# Patient Record
Sex: FEMALE | Race: NATIVE HAWAIIAN OR OTHER PACIFIC ISLANDER | Employment: UNEMPLOYED | URBAN - METROPOLITAN AREA
[De-identification: names, ages, dates, MRNs, and addresses within clinical notes are randomized per-mention and may not be internally consistent; named-entity substitution may affect disease eponyms.]

---

## 2022-02-25 ENCOUNTER — VIRTUAL VISIT (OUTPATIENT)
Dept: FAMILY MEDICINE CLINIC | Age: 22
End: 2022-02-25
Payer: OTHER GOVERNMENT

## 2022-02-25 DIAGNOSIS — Z3A.20 20 WEEKS GESTATION OF PREGNANCY: Primary | ICD-10-CM

## 2022-02-25 PROBLEM — T78.40XA ALLERGIES: Status: ACTIVE | Noted: 2022-02-25

## 2022-02-25 PROCEDURE — 99202 OFFICE O/P NEW SF 15 MIN: CPT | Performed by: FAMILY MEDICINE

## 2022-02-25 RX ORDER — SWAB
1 SWAB, NON-MEDICATED MISCELLANEOUS DAILY
COMMUNITY

## 2022-02-25 RX ORDER — CETIRIZINE HYDROCHLORIDE 10 MG/1
CAPSULE, LIQUID FILLED ORAL
COMMUNITY

## 2022-02-25 NOTE — PROGRESS NOTES
Chief Complaint   Patient presents with   Leigh Gaston Establish Care     No concerns     Referral Request     OB/GYN- 20 weeks pregnant        Pt new to the area, from [place], does not know of a local pharmacy, but states she will call office back to advise.

## 2022-02-25 NOTE — PROGRESS NOTES
Zechariah Moreno is a 24 y.o. female who was seen by synchronous (real-time) audio-video technology on 2/25/2022. Assessment & Plan:   Diagnoses and all orders for this visit:    1. 20 weeks gestation of pregnancy  -     REFERRAL TO OBSTETRICS AND GYNECOLOGY        Follow-up and Dispositions    · Return if symptoms worsen or fail to improve. Reviewed plan of care. Patient has provided input and agrees with goals. CPT Codes 37318-10912 for Established Patients may apply to this Telehealth Visit      Subjective:   Zechariah Moreno was seen for Establish Care (No concerns ) and Referral Request (OB/GYN- 20 weeks pregnant )      Patient presents with:  Establish Care: No concerns   Referral Request: OB/GYN- 20 weeks pregnant     She needs an OB referral.        Review of Systems   Constitutional: Negative for malaise/fatigue. Genitourinary:        No vaginal bleeding or fluid. She just felt fetal movement several days ago. Objective:     Physical Exam  Constitutional:       General: She is not in acute distress. Appearance: Normal appearance. Neurological:      Mental Status: She is alert and oriented to person, place, and time. Due to this being a TeleHealth evaluation, many elements of the physical examination are unable to be assessed. We discussed the expected course, resolution and complications of the diagnosis(es) in detail. Medication risks, benefits, costs, interactions, and alternatives were discussed as indicated. I advised her to contact the office if her condition worsens, changes or fails to improve as anticipated. She expressed understanding with the diagnosis(es) and plan.          Pursuant to the emergency declaration under the [facility], [facility] waiver authority and the [employer] and [employer] General Act, this Virtual  Visit was conducted, with patient's consent, to reduce the patient's risk of exposure to COVID-19 and provide continuity of care for an established patient. Services were provided through a video synchronous discussion virtually to substitute for in-person clinic visit.     Marielle Posey MD

## 2022-03-04 ENCOUNTER — TELEPHONE (OUTPATIENT)
Dept: FAMILY MEDICINE CLINIC | Age: 22
End: 2022-03-04

## 2022-03-07 ENCOUNTER — TELEPHONE (OUTPATIENT)
Dept: FAMILY MEDICINE CLINIC | Age: 22
End: 2022-03-07

## 2022-03-07 DIAGNOSIS — Z3A.20 20 WEEKS GESTATION OF PREGNANCY: Primary | ICD-10-CM

## 2022-03-14 NOTE — TELEPHONE ENCOUNTER
Pt called to check status of  referral to see Dr Deborah Shaw at [facility] on 3/17/22 for OB and 20 week ultrasound.

## 2022-03-15 NOTE — TELEPHONE ENCOUNTER
Luis referral to Dr. Alexandra Griffin cancelled and changed to Dr. Jerry Mccray per pt request.  Referral remains approved under same authorization number ([id number]). Referral faxed to office 3/15/22.  Walter

## 2022-03-19 PROBLEM — T78.40XA ALLERGIES: Status: ACTIVE | Noted: 2022-02-25

## 2022-03-30 LAB
ANTIBODY SCREEN, EXTERNAL: NEGATIVE
CHLAMYDIA, EXTERNAL: NEGATIVE
HBSAG, EXTERNAL: NEGATIVE
HIV, EXTERNAL: NEGATIVE
N. GONORRHEA, EXTERNAL: NEGATIVE
RPR, EXTERNAL: NONREACTIVE
RUBELLA, EXTERNAL: NORMAL
TYPE, ABO & RH, EXTERNAL: NORMAL

## 2022-06-30 LAB — GRBS, EXTERNAL: NEGATIVE

## 2022-07-25 ENCOUNTER — ANESTHESIA (OUTPATIENT)
Dept: LABOR AND DELIVERY | Age: 22
DRG: 560 | End: 2022-07-25
Payer: MEDICAID

## 2022-07-25 ENCOUNTER — HOSPITAL ENCOUNTER (INPATIENT)
Age: 22
LOS: 2 days | Discharge: HOME | DRG: 560 | End: 2022-07-27
Attending: OBSTETRICS & GYNECOLOGY | Admitting: OBSTETRICS & GYNECOLOGY
Payer: MEDICAID

## 2022-07-25 ENCOUNTER — ANESTHESIA EVENT (OUTPATIENT)
Dept: LABOR AND DELIVERY | Age: 22
DRG: 560 | End: 2022-07-25
Payer: MEDICAID

## 2022-07-25 PROBLEM — Z34.90 PREGNANCY: Status: ACTIVE | Noted: 2022-07-25

## 2022-07-25 LAB
DAILY QC (YES/NO)?: YES
ERYTHROCYTE [DISTWIDTH] IN BLOOD BY AUTOMATED COUNT: 13 % (ref 11.5–14.5)
HCT VFR BLD AUTO: 36.8 % (ref 35–47)
HGB BLD-MCNC: 12.8 G/DL (ref 11.5–16)
MCH RBC QN AUTO: 29.7 PG (ref 26–34)
MCHC RBC AUTO-ENTMCNC: 34.8 G/DL (ref 30–36.5)
MCV RBC AUTO: 85.4 FL (ref 80–99)
NRBC # BLD: 0 K/UL (ref 0–0.01)
NRBC BLD-RTO: 0 PER 100 WBC
PH, VAGINAL FLUID: NORMAL (ref 5–6.1)
PLATELET # BLD AUTO: 280 K/UL (ref 150–400)
PMV BLD AUTO: 10.1 FL (ref 8.9–12.9)
RBC # BLD AUTO: 4.31 M/UL (ref 3.8–5.2)
WBC # BLD AUTO: 9.8 K/UL (ref 3.6–11)

## 2022-07-25 PROCEDURE — 2709999900 HC NON-CHARGEABLE SUPPLY

## 2022-07-25 PROCEDURE — 83986 ASSAY PH BODY FLUID NOS: CPT | Performed by: OBSTETRICS & GYNECOLOGY

## 2022-07-25 PROCEDURE — 76060000078 HC EPIDURAL ANESTHESIA: Performed by: STUDENT IN AN ORGANIZED HEALTH CARE EDUCATION/TRAINING PROGRAM

## 2022-07-25 PROCEDURE — 74011250637 HC RX REV CODE- 250/637

## 2022-07-25 PROCEDURE — 74011000250 HC RX REV CODE- 250: Performed by: STUDENT IN AN ORGANIZED HEALTH CARE EDUCATION/TRAINING PROGRAM

## 2022-07-25 PROCEDURE — 75410000000 HC DELIVERY VAGINAL/SINGLE: Performed by: OBSTETRICS & GYNECOLOGY

## 2022-07-25 PROCEDURE — 85027 COMPLETE CBC AUTOMATED: CPT

## 2022-07-25 PROCEDURE — 65270000029 HC RM PRIVATE

## 2022-07-25 PROCEDURE — 74011250637 HC RX REV CODE- 250/637: Performed by: OBSTETRICS & GYNECOLOGY

## 2022-07-25 PROCEDURE — 36415 COLL VENOUS BLD VENIPUNCTURE: CPT

## 2022-07-25 PROCEDURE — 74011250636 HC RX REV CODE- 250/636: Performed by: OBSTETRICS & GYNECOLOGY

## 2022-07-25 PROCEDURE — 77030005513 HC CATH URETH FOL11 MDII -B

## 2022-07-25 PROCEDURE — 75410000002 HC LABOR FEE PER 1 HR: Performed by: OBSTETRICS & GYNECOLOGY

## 2022-07-25 PROCEDURE — 75410000003 HC RECOV DEL/VAG/CSECN EA 0.5 HR: Performed by: OBSTETRICS & GYNECOLOGY

## 2022-07-25 RX ORDER — SODIUM CHLORIDE, SODIUM LACTATE, POTASSIUM CHLORIDE, CALCIUM CHLORIDE 600; 310; 30; 20 MG/100ML; MG/100ML; MG/100ML; MG/100ML
125 INJECTION, SOLUTION INTRAVENOUS CONTINUOUS
Status: DISCONTINUED | OUTPATIENT
Start: 2022-07-25 | End: 2022-07-25

## 2022-07-25 RX ORDER — EPHEDRINE SULFATE/0.9% NACL/PF 50 MG/5 ML
10 SYRINGE (ML) INTRAVENOUS
Status: DISCONTINUED | OUTPATIENT
Start: 2022-07-25 | End: 2022-07-25

## 2022-07-25 RX ORDER — LIDOCAINE HYDROCHLORIDE AND EPINEPHRINE 15; 5 MG/ML; UG/ML
INJECTION, SOLUTION EPIDURAL
Status: SHIPPED | OUTPATIENT
Start: 2022-07-25 | End: 2022-07-25

## 2022-07-25 RX ORDER — SIMETHICONE 80 MG
80 TABLET,CHEWABLE ORAL
Status: DISCONTINUED | OUTPATIENT
Start: 2022-07-25 | End: 2022-07-27 | Stop reason: HOSPADM

## 2022-07-25 RX ORDER — ZOLPIDEM TARTRATE 5 MG/1
5 TABLET ORAL
Status: DISCONTINUED | OUTPATIENT
Start: 2022-07-25 | End: 2022-07-27 | Stop reason: HOSPADM

## 2022-07-25 RX ORDER — ACETAMINOPHEN 325 MG/1
650 TABLET ORAL
Status: DISCONTINUED | OUTPATIENT
Start: 2022-07-25 | End: 2022-07-27 | Stop reason: HOSPADM

## 2022-07-25 RX ORDER — IBUPROFEN 800 MG/1
800 TABLET ORAL EVERY 8 HOURS
Status: DISCONTINUED | OUTPATIENT
Start: 2022-07-25 | End: 2022-07-27 | Stop reason: HOSPADM

## 2022-07-25 RX ORDER — DOCUSATE SODIUM 100 MG/1
100 CAPSULE, LIQUID FILLED ORAL
Status: DISCONTINUED | OUTPATIENT
Start: 2022-07-25 | End: 2022-07-27 | Stop reason: HOSPADM

## 2022-07-25 RX ORDER — FENTANYL/BUPIVACAINE/NS/PF 2-1250MCG
1-16 PREFILLED PUMP RESERVOIR EPIDURAL CONTINUOUS
Status: DISCONTINUED | OUTPATIENT
Start: 2022-07-25 | End: 2022-07-25

## 2022-07-25 RX ORDER — ONDANSETRON 2 MG/ML
4 INJECTION INTRAMUSCULAR; INTRAVENOUS
Status: DISCONTINUED | OUTPATIENT
Start: 2022-07-25 | End: 2022-07-25

## 2022-07-25 RX ORDER — SODIUM CHLORIDE 0.9 % (FLUSH) 0.9 %
5-40 SYRINGE (ML) INJECTION EVERY 8 HOURS
Status: DISCONTINUED | OUTPATIENT
Start: 2022-07-25 | End: 2022-07-25

## 2022-07-25 RX ORDER — OXYTOCIN/RINGER'S LACTATE 30/500 ML
87.3 PLASTIC BAG, INJECTION (ML) INTRAVENOUS AS NEEDED
Status: DISCONTINUED | OUTPATIENT
Start: 2022-07-25 | End: 2022-07-25

## 2022-07-25 RX ORDER — SODIUM CHLORIDE 0.9 % (FLUSH) 0.9 %
5-40 SYRINGE (ML) INJECTION AS NEEDED
Status: DISCONTINUED | OUTPATIENT
Start: 2022-07-25 | End: 2022-07-25

## 2022-07-25 RX ORDER — NALOXONE HYDROCHLORIDE 0.4 MG/ML
0.4 INJECTION, SOLUTION INTRAMUSCULAR; INTRAVENOUS; SUBCUTANEOUS AS NEEDED
Status: DISCONTINUED | OUTPATIENT
Start: 2022-07-25 | End: 2022-07-25

## 2022-07-25 RX ORDER — OXYTOCIN/RINGER'S LACTATE 30/500 ML
10 PLASTIC BAG, INJECTION (ML) INTRAVENOUS AS NEEDED
Status: DISCONTINUED | OUTPATIENT
Start: 2022-07-25 | End: 2022-07-27 | Stop reason: HOSPADM

## 2022-07-25 RX ORDER — OXYTOCIN/RINGER'S LACTATE 30/500 ML
10 PLASTIC BAG, INJECTION (ML) INTRAVENOUS AS NEEDED
Status: DISCONTINUED | OUTPATIENT
Start: 2022-07-25 | End: 2022-07-25

## 2022-07-25 RX ORDER — NALOXONE HYDROCHLORIDE 0.4 MG/ML
0.4 INJECTION, SOLUTION INTRAMUSCULAR; INTRAVENOUS; SUBCUTANEOUS AS NEEDED
Status: DISCONTINUED | OUTPATIENT
Start: 2022-07-25 | End: 2022-07-27 | Stop reason: HOSPADM

## 2022-07-25 RX ORDER — METHYLERGONOVINE MALEATE 0.2 MG/ML
0.2 INJECTION INTRAVENOUS
Status: ACTIVE | OUTPATIENT
Start: 2022-07-25 | End: 2022-07-26

## 2022-07-25 RX ORDER — MINERAL OIL
OIL (ML) ORAL
Status: COMPLETED
Start: 2022-07-25 | End: 2022-07-25

## 2022-07-25 RX ORDER — DIPHENHYDRAMINE HCL 25 MG
25 CAPSULE ORAL
Status: DISCONTINUED | OUTPATIENT
Start: 2022-07-25 | End: 2022-07-27 | Stop reason: HOSPADM

## 2022-07-25 RX ORDER — HYDROCORTISONE ACETATE PRAMOXINE HCL 2.5; 1 G/100G; G/100G
CREAM TOPICAL AS NEEDED
Status: DISCONTINUED | OUTPATIENT
Start: 2022-07-25 | End: 2022-07-25 | Stop reason: RX

## 2022-07-25 RX ORDER — MINERAL OIL
30 OIL (ML) ORAL DAILY
Status: DISCONTINUED | OUTPATIENT
Start: 2022-07-26 | End: 2022-07-25

## 2022-07-25 RX ORDER — NALOXONE HYDROCHLORIDE 0.4 MG/ML
0.4 INJECTION, SOLUTION INTRAMUSCULAR; INTRAVENOUS; SUBCUTANEOUS ONCE
Status: DISCONTINUED | OUTPATIENT
Start: 2022-07-25 | End: 2022-07-25

## 2022-07-25 RX ORDER — MINERAL OIL
OIL (ML) MISCELLANEOUS ONCE
Status: DISCONTINUED | OUTPATIENT
Start: 2022-07-25 | End: 2022-07-25

## 2022-07-25 RX ORDER — HYDROCODONE BITARTRATE AND ACETAMINOPHEN 5; 325 MG/1; MG/1
2 TABLET ORAL
Status: DISCONTINUED | OUTPATIENT
Start: 2022-07-25 | End: 2022-07-27 | Stop reason: HOSPADM

## 2022-07-25 RX ORDER — HYDROCODONE BITARTRATE AND ACETAMINOPHEN 5; 325 MG/1; MG/1
1 TABLET ORAL
Status: DISCONTINUED | OUTPATIENT
Start: 2022-07-25 | End: 2022-07-27 | Stop reason: HOSPADM

## 2022-07-25 RX ORDER — ONDANSETRON 2 MG/ML
4 INJECTION INTRAMUSCULAR; INTRAVENOUS
Status: DISCONTINUED | OUTPATIENT
Start: 2022-07-25 | End: 2022-07-27 | Stop reason: HOSPADM

## 2022-07-25 RX ORDER — OXYTOCIN/RINGER'S LACTATE 30/500 ML
87.3 PLASTIC BAG, INJECTION (ML) INTRAVENOUS AS NEEDED
Status: DISCONTINUED | OUTPATIENT
Start: 2022-07-25 | End: 2022-07-27 | Stop reason: HOSPADM

## 2022-07-25 RX ADMIN — MINERAL OIL 30 ML: 1000 SOLUTION ORAL at 19:39

## 2022-07-25 RX ADMIN — LIDOCAINE HYDROCHLORIDE AND EPINEPHRINE 4.5 ML: 15; 5 INJECTION, SOLUTION EPIDURAL at 12:38

## 2022-07-25 RX ADMIN — DOCUSATE SODIUM 100 MG: 100 CAPSULE, LIQUID FILLED ORAL at 21:25

## 2022-07-25 RX ADMIN — SODIUM CHLORIDE, POTASSIUM CHLORIDE, SODIUM LACTATE AND CALCIUM CHLORIDE 125 ML/HR: 600; 310; 30; 20 INJECTION, SOLUTION INTRAVENOUS at 13:10

## 2022-07-25 RX ADMIN — IBUPROFEN 800 MG: 800 TABLET, FILM COATED ORAL at 21:25

## 2022-07-25 RX ADMIN — SODIUM CHLORIDE, POTASSIUM CHLORIDE, SODIUM LACTATE AND CALCIUM CHLORIDE 999 ML/HR: 600; 310; 30; 20 INJECTION, SOLUTION INTRAVENOUS at 11:50

## 2022-07-25 RX ADMIN — Medication 30 ML: at 19:39

## 2022-07-25 RX ADMIN — Medication 12 ML/HR: at 13:10

## 2022-07-25 NOTE — PROGRESS NOTES
Epidural in place  Cervix 9/c/0  AROM of light mec  Recheck 1-2hrs    Ruby John MD  [facility]

## 2022-07-25 NOTE — ANESTHESIA PREPROCEDURE EVALUATION
Relevant Problems   No relevant active problems       Anesthetic History   No history of anesthetic complications            Review of Systems / Medical History  Patient summary reviewed, nursing notes reviewed and pertinent labs reviewed    Pulmonary  Within defined limits                 Neuro/Psych   Within defined limits           Cardiovascular                  Exercise tolerance: >4 METS     GI/Hepatic/Renal  Within defined limits              Endo/Other  Within defined limits           Other Findings            Physical Exam    Airway  Mallampati: II  TM Distance: 4 - 6 cm  Neck ROM: normal range of motion   Mouth opening: Normal     Cardiovascular  Regular rate and rhythm,  S1 and S2 normal,  no murmur, click, rub, or gallop             Dental    Dentition: Upper dentition intact and Lower dentition intact     Pulmonary  Breath sounds clear to auscultation               Abdominal         Other Findings            Anesthetic Plan    ASA: 2  Anesthesia type: epidural      Post-op pain plan if not by surgeon: indwelling epidural catheter    Induction: Intravenous  Anesthetic plan and risks discussed with: Patient

## 2022-07-25 NOTE — ANESTHESIA PROCEDURE NOTES
Epidural Block    Start time: 7/25/2022 12:30 PM  End time: 7/25/2022 12:40 PM  Reason for block: labor epidural  Staffing  Performed: attending   Anesthesiologist: Jack Waggoner DO  Preanesthetic Checklist  Completed: patient identified, IV checked, site marked, risks and benefits discussed, surgical consent, monitors and equipment checked, pre-op evaluation, timeout performed and fire risk safety assessment completed and verbalized  Block Placement  Patient position: sitting  Prep: ChloraPrep  Sterility prep: mask, gloves, drape and cap  Sedation level: no sedation  Patient monitoring: frequent blood pressure checks and heart rate  Approach: midline  Location: lumbar  Lumbar location: L2-L3  Epidural  Loss of resistance technique: saline  Guidance: landmark technique  Needle  Needle type: Tuohy   Needle gauge: 20 G  Needle length: 10 cm  Needle insertion depth: 6 cm  Catheter type: end hole  Catheter size: 20 G  Catheter at skin depth: 12 cm  Catheter securement method: clear occlusive dressing, liquid medical adhesive and surgical tape  Test dose: negative  Medications Administered  lidocaine-EPINEPHrine (XYLOCAINE) 1.5 %-1:200,000 Epidural - Epidural   4.5 mL - 7/25/2022 12:38:00 PM  Assessment  Block outcome: pain improved  Number of attempts: 1  Procedure assessment: patient tolerated procedure well with no immediate complications

## 2022-07-25 NOTE — H&P
~~~~~~~~~~~~~LATE ENTRY~~~~~~~~~~~~~~    History & Physical    Name: Finn Rodriguez MRN: [id number]  SSN: [id number]    YOB: 2000  Age: 20 y.o. Sex: female        Subjective:     Estimated Date of Delivery: 22  OB History          1    Para        Term                AB        Living             SAB        IAB        Ectopic        Molar        Multiple        Live Births                    Ms. Michelle Santos is admitted with pregnancy at 40w0d for active labor. Prenatal course was normal. Please see prenatal records for details. History reviewed. No pertinent past medical history. History reviewed. No pertinent surgical history. Social History     Occupational History    Not on file   Tobacco Use    Smoking status: Never    Smokeless tobacco: Never   Vaping Use    Vaping Use: Never used   Substance and Sexual Activity    Alcohol use: Not Currently    Drug use: Never    Sexual activity: Yes     Partners: Male     Birth control/protection: None     Family History   Problem Relation Age of Onset    Hypertension Father     Cancer Father         renal    Cancer Maternal Grandmother     Hypertension Maternal Grandmother     Hypertension Maternal Grandfather     Diabetes Maternal Grandfather     Cancer Paternal Grandmother     Hypertension Paternal Grandmother     Kidney Disease Paternal Grandmother     Cancer Paternal Grandfather     Hypertension Paternal Grandfather     Diabetes Paternal Grandfather     No Known Problems Brother     No Known Problems Brother        No Known Allergies  Prior to Admission medications    Medication Sig Start Date End Date Taking? Authorizing Provider   prenatal vit-iron fumarate-fa (PRENATAL PLUS with IRON) 28 mg iron- 800 mcg tab Take 1 Tablet by mouth daily. Yes Provider, Historical   Cetirizine (ZyrTEC) 10 mg cap Take  by mouth.    Yes Provider, Historical        Review of Systems: A comprehensive review of systems was negative except for that written in the HPI.    Objective:     Vitals:  Vitals:    07/25/22 1304 07/25/22 1308 07/25/22 1309 07/25/22 1339   BP: 125/76  135/80 116/75   Pulse: 78  73 67   Resp:       Temp:       SpO2:  98%     Weight:       Height:            Physical Exam:  Patient without distress. Heart: normal peripheral perfusion  Lung: normal respiratory effort  Abdomen: soft, nontender  Cervical Exam: 8 cm dilated    100% effaced    0 station    Lower Extremities:  - Edema No  EFW 7.5#  Membranes:  Intact  Fetal Heart Rate: Reactive    Prenatal Labs:   Lab Results   Component Value Date/Time    Rubella, External Immune 03/30/2022 12:00 AM    GrBStrep, External Negative 06/30/2022 12:00 AM    HBsAg, External Negative 03/30/2022 12:00 AM    HIV, External Negative 03/30/2022 12:00 AM    RPR, External Nonreactive 03/30/2022 12:00 AM    Gonorrhea, External Negative 03/30/2022 12:00 AM    Chlamydia, External Negative 03/30/2022 12:00 AM        Assessment/Plan:     Plan: Admit for Reassuring fetal status, Labor  Progressing normally  Continue expectant management, Continue plan for vaginal delivery. Group B Strep was negative.   - consents signed, questions answered  - pit, arom as needed  - epidural    Signed By:  Gwyn Soto MD     July 25, 2022

## 2022-07-25 NOTE — PROGRESS NOTES
1117: Pt arrives ambulatory from office. Pt states she had a scheduled appointment today and she was 8 cm in the office. Pt is intact. Pt denies any complications with this pregnancy. Pt has positive fetal movement and denies loss of fluid or vaginal bleeding. 1135: Pt placed on EFM. 1150: Pt bolusing for epidural.    1230: Time out for epidural placement with MD Curtis. 1233: Test dose administered. 1345: Pt states she feels like her water broke. Some liquid noted to pad. Rivas catheter in place and intact/draining. RN to check fluid with nitrazine. 1346: MD Curtis at bedside for re-dose. 1350: Nitrazine test result is equivocal at this time. MD to let MD Adam Lam know. 1449: MD Holloway at bedside. SVE is 9/100/0. AROM at this time, scant amount of thin meconium stained fluid. 1714: Pt complaining of rectal pressure. SVE per MD 10/100/+1. VORB from MD Son Campbell to start pushing. 1721: MD Son Campbell at bedside. 1732: Patient actively pushing. RN remains in continuous attendance at the bedside. Assessment & evaluation of fetal heart rate ongoing via continuous EFM. 1827: FSE applied by MD Son Campbell. 1835: MD Son Campbell at bedside with ultrasound to confirm position of baby. MD able to manually rotate baby to PATRICIA from OP. Pt to continue pushing.    1900: Bedside and Verbal shift change report given to [address] [place] [address] (oncoming nurse) by Jerardo Baig RN (offgoing nurse). Report included the following information SBAR, Kardex, Procedure Summary, Intake/Output, MAR, and Recent Results.

## 2022-07-25 NOTE — PROGRESS NOTES
Labor Progress Note  Patient seen, fetal heart rate and contraction pattern evaluated, patient examined. Patient is comfortable with epidural.  Visit Vitals  /77   Pulse 79   Temp 98.8 °F (37.1 °C)   Resp 17   Ht 5' (1.524 m)   Wt 77.1 kg (170 lb)   SpO2 100%   BMI 33.20 kg/m²       Physical Exam:    20 y.o.  in no acute distress. Cervical Exam:   Presentation LOP  Dilation 10 cms   Effacement 100 %   Station +3  Membranes: Prior amniotomy: Clear  Uterine Activity:   Frequency: Every 3 minutes   Duration: 60 seconds   Intensity: Moderate  Fetal Heart Rate:    Baseline: 141 bpm   Variability: Moderate   Accelerations: Absent   Decelerations: Variable  Category: 2    Oxytocin (saul-units/minute): 0    US confirmed LOP presentation and AOP of 157 degrees, +3 station, and spine to maternal left. I did a manual rotation to PATRICIA, confirmed with US      Recent Results (from the past 12 hour(s))   CBC W/O DIFF    Collection Time: 22 11:42 AM   Result Value Ref Range    WBC 9.8 3.6 - 11.0 K/uL    RBC 4.31 3.80 - 5.20 M/uL    HGB 12.8 11.5 - 16.0 g/dL    HCT 36.8 35.0 - 47.0 %    MCV 85.4 80.0 - 99.0 FL    MCH 29.7 26.0 - 34.0 PG    MCHC 34.8 30.0 - 36.5 g/dL    RDW 13.0 11.5 - 14.5 %    PLATELET 152 503 - 311 K/uL    MPV 10.1 8.9 - 12.9 FL    NRBC 0.0 0  WBC    ABSOLUTE NRBC 0.00 0.00 - 0.01 K/uL   PH BY NITRAZINE, POC    Collection Time: 22  1:50 PM   Result Value Ref Range    pH-Nitrazine paper Equivocal 5.0 - 6.1    Daily QC performed?  Yes      Assessment/Plan:  Keep pushing  Angelique Fisher MD  2022

## 2022-07-26 PROCEDURE — 74011250637 HC RX REV CODE- 250/637: Performed by: OBSTETRICS & GYNECOLOGY

## 2022-07-26 PROCEDURE — 65270000029 HC RM PRIVATE

## 2022-07-26 PROCEDURE — 2709999900 HC NON-CHARGEABLE SUPPLY

## 2022-07-26 RX ADMIN — IBUPROFEN 800 MG: 800 TABLET, FILM COATED ORAL at 21:52

## 2022-07-26 NOTE — DISCHARGE INSTRUCTIONS
Discharge Instructions for Vaginal Delivery    Patient ID:  Cole Goodwin  [id number]  20 y.o.  2000    Take Home Medications           Continue taking your prenatal vitamins if you are breastfeeding. Follow-up Appointment:  Follow-up with vpfw in 6 weeks. Follow-up care is a key part of your treatment and safety. Be sure to make and go to all appointments, and call your doctor if you are having problems. Its also a good idea to know your test results and keep a list of the medicines you take. Activity  Avoid anything in your vagina for 6 weeks (no intercourse, tampons, or douching). You may drive unless you are taking prescription pain medications. Climbing stairs and light lifting are ok after a vaginal delivery unless your doctor tells you not to. You may gradually work up to exercise over the next few weeks, but take it easy- you'll be tired! Diet  You may eat a regular diet but you may want to avoid heavy, greasy foods and other foods that could increase constipation. Wound care  If you have stitches, continue to rinse with a squirt bottle of warm water each time you use the bathroom for about 2 weeks. Your stitches will gradually dissolve over several weeks. Sitz baths are also helpful to keep the wound clean, encourage healing, and to help with pain associated with the stitches or hemorrhoids. You can use either a sitz bath basin or a bathtub filled with 2-3\" inches of plain warm water. Soak for 10 minutes 3 times a day. Pain Management  If you were not given a prescription pain medication, you can take over the counter pain medicines like Tylenol (acetominophen), Advil or Motrin (ibuprofen). You can take acetominophen and ibuprofen together, alternating doses every few hours.   You will get the most relief if you take the maximum dose:  Tylenol or acetominophen 1000 mg every 6 hours (equivalent to 2 Extra Strength Tylenols every 6 hours)  Motrin or Advil (generic ibuprofen) 800 mg every 8 hours (4 tablets or capsules every 8 hours)  If you were given a prescription pain medication, you can take ibuprofen along with it (doses as above), but not Tylenol. Use ibuprofen as the main medication, and take the prescription medication if needed for more severe pain not relieved by the ibuprofen. Your goal should be to take only the minimum necessary amounts of the prescription medication (narcotic), as these pain medicines can be habit-forming and will worsen or cause constipation. Most patients will find that within a couple of days, their pain is adequately controlled using only over-the-counter medications. A heating pad can also be very helpful for cramping or back pain. Over-the-counter hemorrhoid wipes and ointments are fine to use if you have hemorrhoids. Constipation  You may find that bowel movements are irregular after delivery and that you have a tendency to be constipated. If you have stitches (and especially if you had a more severe tear called a third- or fourth-degree), your bowel movements will be more comfortable if they are soft. A stool softener such as Colace (docusate) can safely be taken daily if needed. If you become constipated you can use a laxative such as Dulcolax, Miralax or Milk of Magnesia. Don't wait until constipation is severe- take something sooner rather than later and you will feel much better! Your Recovery: What to Expect at Home  Delivering a baby is hard work and you probably aren't getting much sleep, so you will certainly be tired. Try to rest when you can and don't worry about doing housework or other tasks which can wait. If you're experiencing soreness or swelling in your bottom, this should improve over the next few days to 2 weeks. You are likely to have some back pain or general body aches or muscle soreness. This should improve with acetominophen or ibuprofen.   If your legs were swollen during your pregnancy or as a result of IV fluids given during your hospital stay, this should go away in a few days to a week. Most women experience some form of the \"Baby Blues\" after having a baby. This means that you may feel emotional, tearful, frustrated, anxious, sad, and irritable some of the time. This is normal if it's not severe and should go away after about 2 weeks. Getting as much rest as you can will help. Accept assistance from friends and family members so that you can take breaks from caring for the baby. Call your doctor if your symptoms seem severe, last more than 2 weeks, or seem to be getting worse instead of better. Get help immediately if you have thoughts of wanting to hurt yourself or others! When should you call for help? Call 911 anytime you think you may need emergency care. For example, call if:  You pass out (lose consciousness). You have sudden chest pain and shortness of breath, or you cough up blood. You have severe pain in your belly. Call your doctor now or seek immediate medical care if:  You have heavy vaginal bleeding that soaks one or more pads in an hour, or you have large clots (golf ball size or larger). Your have foul-smelling discharge from your vagina. You are sick to your stomach or cannot keep fluids down. You have pain that does not get better after you take pain medicine. You have signs of infection, such as: Increased pain in your abdomen or vaginal area  Red streaks, warmth, or tenderness of your breasts. A fever of 101 or greater (taken by mouth). You have signs of a blood clot, such as:  Pain in your calf, back of knee, thigh, or groin. Redness and swelling in your leg or groin. You have trouble passing urine or stool, especially if you have pain or swelling in your lower belly; or if you are unable to have a bowel movement after taking a laxative. You have a fast or pounding heartbeat.

## 2022-07-26 NOTE — DISCHARGE SUMMARY
Patient ID:  Froy Turner  [id number]  20 y.o.  2000    Admit Date: 2022    Discharge Date: 2022     Admitting Physician: Kayla Mireles MD    Attending Physician: Miguel Hogue MD    Admission Diagnoses: Pregnancy [Z34.90]    Procedures for this admission:     Discharge Diagnoses: Same as above with vaginally producing a viable infant. Information for the patient's :  Eula Tejada [[id number]]          Discharge Disposition:  home    Discharge Condition:  stable    Additional Diagnoses:  labor . Maternal Labs:   Lab Results   Component Value Date/Time    HBsAg, External Negative 2022 12:00 AM    HIV, External Negative 2022 12:00 AM    Rubella, External Immune 2022 12:00 AM    RPR, External Nonreactive 2022 12:00 AM    GrBStrep, External Negative 2022 12:00 AM       Cord Blood Results:   Information for the patient's :  Eula Tejada [[id number]]     Lab Results   Component Value Date/Time    ABO/Rh(D) O POSITIVE 2022 08:30 PM    JEANETTE IgG NEG 2022 08:30 PM    Bilirubin if JEANETTE pos: IF DIRECT ROLAND POSITIVE, BILIRUBIN TO FOLLOW 2022 08:30 PM            History of Present Illness:   OB History          1    Para   1    Term   1            AB        Living   1         SAB        IAB        Ectopic        Molar        Multiple   0    Live Births   1              Admitted for active labor. Hospital Course:   Patient was admitted as above and delivered via vagina by DR. Parag Pinto . Please the chart for details. The postpartum course was unremarkable. She was deemed stable for discharge home on day 2.     Follow-up Care: 4-6 weeks        Signed:  Teresita Kramer MD  2022  6:07 PM

## 2022-07-26 NOTE — PROGRESS NOTES
1900: SBAR report received from, care assumed at this time. This RN at the bedside, pt actively pushing. 1958: Dr Apolinar Matos at the bedside for delivery. 1959: RN remained at bedside throughout pushing. EFM continuously assessed. Vaginal delivery of viable female infant. 2003: Delivery of placenta. 2010: Perineum intact, mary care provided and pt skin to skin with baby. 2330: TRANSFER - OUT REPORT:    Verbal report given to [facility] RN(name) on [facility]  being transferred to MIU(unit) for routine progression of care       Report consisted of patients Situation, Background, Assessment and   Recommendations(SBAR). Information from the following report(s) SBAR, Kardex, Procedure Summary, Intake/Output, MAR, Accordion, Recent Results, and Med Rec Status was reviewed with the receiving nurse. Lines:   Peripheral IV 07/25/22 Left;Posterior Hand (Active)   Site Assessment Clean, dry, & intact 07/25/22 1539   Phlebitis Assessment 0 07/25/22 1539   Infiltration Assessment 0 07/25/22 1539   Dressing Status Clean, dry, & intact 07/25/22 1539   Dressing Type Tape;Transparent 07/25/22 1539   Hub Color/Line Status Pink 07/25/22 1539        Opportunity for questions and clarification was provided.       Patient transported with:   Registered Nurse

## 2022-07-26 NOTE — L&D DELIVERY NOTE
Delivery Procedure Note    2022    Delivery Physician:  Angelique Fisher MD    Procedure: Spontaneous vaginal delivery    Anesthesia: Epidural    Monitor:  Fetal Heart Tones-Internal and Uterine Contractions- External    Estimated Blood Loss: 200    Episiotomy: none    Laceration(s):  labial    Laceration(s) repair: NO    Suture used for repair: None    Fetal Description: carreno    Fetal Position: Left Occiput Anterior    [place] Interventions: Nasopharyngeal/Oropharyngeal Suctioning, Warming/Drying, and Monitoring vital signs    Birth:   Information for the patient's :  Elverna Apley [[id number]]   Delivery of a   female infant on 2022 at 7:59 PM. Apgars were   and  . Umbilical Cord: 3 Vessels     Umbilical Cord Events: None     Placenta: Expressed removal with Normal;Intact appearance.     Amniotic Fluid Volume:        Amniotic Fluid Description:  Meconium      Birth weight: Pending    Apgar - One Minute: 9    Apgar - Five Minutes: 9    Umbilical Cord: 3 vessels present    Placenta Removal: expressed    Placenta Appearance: normal intact      Specimens: None           Complications:none      Signed By:  Angelique Fisher MD     2022

## 2022-07-26 NOTE — PROGRESS NOTES
Post-Partum Day Number 1 Progress Note    Patient doing well post-partum without significant complaint. Vitals:  Patient Vitals for the past 8 hrs:   BP Temp Pulse Resp SpO2   22 0754 121/73 97.9 °F (36.6 °C) 70 16 97 %     Temp (24hrs), Av.4 °F (36.9 °C), Min:97.9 °F (36.6 °C), Max:98.9 °F (37.2 °C)      Vital signs stable, afebrile. Exam:  Patient without distress. Abdomen soft, fundus firm below umbilicus, nontender                          Lower extremities are negative for swelling, cords or tenderness. Lab/Data Review: All lab results for the last 24 hours reviewed. Assessment and Plan:  Patient appears to be having uncomplicated post-partum course. Continue routine perineal care and maternal education. Plan discharge tomorrow if no problems occur.

## 2022-07-27 VITALS
HEIGHT: 60 IN | SYSTOLIC BLOOD PRESSURE: 122 MMHG | WEIGHT: 170 LBS | HEART RATE: 68 BPM | OXYGEN SATURATION: 98 % | TEMPERATURE: 97.9 F | RESPIRATION RATE: 17 BRPM | DIASTOLIC BLOOD PRESSURE: 86 MMHG | BODY MASS INDEX: 33.38 KG/M2

## 2022-07-27 PROCEDURE — 74011250637 HC RX REV CODE- 250/637: Performed by: OBSTETRICS & GYNECOLOGY

## 2022-07-27 RX ADMIN — IBUPROFEN 800 MG: 800 TABLET, FILM COATED ORAL at 06:13

## 2022-07-27 RX ADMIN — MUPIROCIN: 20 OINTMENT TOPICAL at 06:12

## 2022-07-27 NOTE — LACTATION NOTE
This note was copied from a baby's chart. Dyad for discharge today. Mom has been blend feeding her infant. Reviewed volumes for small stomach sizes, offering breast before formula, as well as paced bottle feeding. Reviewed feeding output and feeding expectations for first few days of life. Output adequate and weight loss WNL. Chart shows numerous feedings, void, stool WNL. Discussed importance of monitoring outputs and feedings on first week of life. Discussed ways to tell if baby is  getting enough breast milk, ie  voids and stools, change in color of stool, and return to birth wt within 2 weeks. Follow up with pediatrician visit for weight check in 1-2 days (per AAP guidelines.)  Encouraged to call Warm Line  [phone]  for any questions/problems that arise. Mother also given breastfeeding support group dates and times for any future needs. Pt taught that Paced Bottle Feeding is a method of bottle feeding that allows the infant to be more in control of the feeding pace. This method slows the flow of milk into the nipple and mouth, allowing the baby to eat more slowly, and take breaks. Paced feeding reduces the risk of overfeeding that may result in discomfort to the baby. This feeding method is recommended for any baby that receives bottles, whether fully bottle fed, or fed from the breast and a bottle. Pt taught to use slow flow nipple, hold baby in a semi-upright position, supporting the head and neck. When baby shows hunger cues, tickle babys lip so he opens his mouth wide. Insert nipple into babys mouth, making sure the baby has a deep latch. Hold the bottle flat, (horizontal to the floor). Let the baby begin sucking on the nipple without milk, then tip the bottle just enough to fill the nipple about shelter with milk. Let baby suck for a few continuous swallows--20-30 seconds. After a few swallows, tip the bottle back down, giving baby a little break.  After a few seconds, when the baby begins to suck again, tip bottle up to allow milk to flow into the nipple. Continue this Paced Feeding until baby shows fullness signs - no longer sucking after the break, turning away or pushing away from the nipple. This method can help to facilitate between breast and bottle. Pt will successfully establish breastfeeding by feeding in response to early feeding cues   or wake every 3h, will obtain deep latch, and will keep log of feedings/output. Taught to BF at hunger cues and or q 2-3 hrs and to offer 10-20 drops of hand expressed colostrum at any non-feeds.       Breast Assessment  Left Breast:  (deferred for family in room)  Left Nipple: Everted, Intact, Tender  Right Breast: Large  Right Nipple: Everted, Tender, Intact  Breast- Feeding Assessment  Attends Breast-Feeding Classes: No  Breast-Feeding Experience: No  Breast Trauma/Surgery: No  Type/Quality: Good (per mother)  Lactation Consultant Visits  Breast-Feedings:  (didn't see baby at breast)

## 2022-07-27 NOTE — PROGRESS NOTES
Patient signed hard copy of discharge instructions due to electronic signature not working. Patient off unit in stable condition via wheelchair with volunteers for discharge per Dr. Edinson Genao. Patient is aware to follow up in 6 weeks. Patient denies headache, NV, dizziness or pain at this time. Infant in carseat and discharged with mother.

## 2022-07-27 NOTE — ROUTINE PROCESS
Bedside and Verbal shift change report given to A Hernando RN (oncoming nurse) by Mp Meyer RN (offgoing nurse). Report included the following information SBAR, Kardex, Intake/Output, MAR, and Accordion.

## 2022-07-27 NOTE — PROGRESS NOTES
[facility] Note    Janelle Shipley  [id number]  2000  20 y.o.    S:  Ms. Janelle Shipley is a 20 y.o.  PPD #2 s/p  @ 40w0d. Doing well. She had a baby girl. Her lochia is like a period. She describes her pain as mild and is well controlled with PO medications. SShe is ambulating and voiding. Tolerating PO intake. O:   Visit Vitals  /86 (BP 1 Location: Right upper arm, BP Patient Position: At rest)   Pulse 68   Temp 97.9 °F (36.6 °C)   Resp 17   Ht 5' (1.524 m)   Wt 77.1 kg (170 lb)   SpO2 98%   Breastfeeding Unknown   BMI 33.20 kg/m²       Lab Results   Component Value Date/Time    WBC 9.8 2022 11:42 AM    HGB 12.8 2022 11:42 AM    HCT 36.8 2022 11:42 AM    PLATELET 843  11:42 AM    MCV 85.4 2022 11:42 AM       Gen - No acute distress  Abdomen - Fundus firm, below the umbilicus   Ext - Warm, well perfused. Nontender    A/P:  PPD #2 s/p  @ 40w0d doing well. 1.  Routine PP instructions/ care discussed  2. Blood type - Rh positive  3. Rubella immune  4. Circumcision n/a   5. Discharge today   6. F/U 4-6 weeks for PP check.       Pati Taylor MD  [facility]

## 2025-03-10 ENCOUNTER — TELEPHONE (OUTPATIENT)
Age: 25
End: 2025-03-10

## 2025-03-10 NOTE — TELEPHONE ENCOUNTER
Voicemail left for pt to call regarding new pt appointment.    ----- Message from ABBY LUCAS RN sent at 3/10/2025  2:22 PM EDT -----  Regarding: FW: [facility] Appointment Request    ----- Message -----  From: Nargis Page  Sent: 3/10/2025   2:14 PM EDT  To: [facility] Clinical Staff  Subject: [facility] Appointment Request                          [facility] Appointment Request    Patient needs appointment for [facility] Appointment Type: New Patient.    Patient Requested Dates(s): as soon as possible in March   Patient Requested Time: afternoon appointment   Provider Name: any available female doctor     Reason for Appointment Request: New Patient - Available appointments did not meet patient need  --------------------------------------------------------------------------------------------------------------------------    Relationship to Patient: Self     Call Back Information: OK to leave message on voicemail  Preferred Call Back Number: [phone]           Patient needs to do a physical as soon as possible